# Patient Record
(demographics unavailable — no encounter records)

---

## 2024-12-11 NOTE — DISCUSSION/SUMMARY
[FreeTextEntry1] : Informed Ms. 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 Of note, the familial NTHL1 mutilation identified in her mother was NOT identified in Ms. Stovall sample. We briefly reviewed the results and recommended scheduling a follow-up collaborative appointment with myself and one of our physicians. Ms. Stovall agreed, our scheduling team will reach out. Copy of the report will be scanned to her chart and was also released to Ms. Stovall via the laboratory portal.   Ras Dixon MS, [facility] Genetic Counselor